# Patient Record
Sex: MALE | Race: BLACK OR AFRICAN AMERICAN | URBAN - METROPOLITAN AREA
[De-identification: names, ages, dates, MRNs, and addresses within clinical notes are randomized per-mention and may not be internally consistent; named-entity substitution may affect disease eponyms.]

---

## 2018-02-06 ENCOUNTER — OFFICE VISIT (OUTPATIENT)
Dept: ORTHOPEDIC SURGERY | Age: 63
End: 2018-02-06

## 2018-02-06 VITALS
HEART RATE: 56 BPM | RESPIRATION RATE: 18 BRPM | HEIGHT: 73 IN | SYSTOLIC BLOOD PRESSURE: 152 MMHG | DIASTOLIC BLOOD PRESSURE: 95 MMHG

## 2018-02-06 DIAGNOSIS — M48.062 SPINAL STENOSIS, LUMBAR REGION WITH NEUROGENIC CLAUDICATION: ICD-10-CM

## 2018-02-06 DIAGNOSIS — M51.36 DDD (DEGENERATIVE DISC DISEASE), LUMBAR: Primary | ICD-10-CM

## 2018-02-06 DIAGNOSIS — M54.16 LUMBAR NEURITIS: ICD-10-CM

## 2018-02-06 DIAGNOSIS — F41.8 TEST ANXIETY: Primary | ICD-10-CM

## 2018-02-06 RX ORDER — GABAPENTIN 300 MG/1
300 CAPSULE ORAL 3 TIMES DAILY
Qty: 90 CAP | Refills: 1 | Status: SHIPPED | OUTPATIENT
Start: 2018-02-06 | End: 2018-06-12 | Stop reason: SDUPTHER

## 2018-02-06 RX ORDER — AMLODIPINE BESYLATE 5 MG/1
TABLET ORAL
Refills: 0 | COMMUNITY
Start: 2018-02-02

## 2018-02-06 RX ORDER — CHLORTHALIDONE 25 MG/1
TABLET ORAL
Refills: 0 | COMMUNITY
Start: 2018-02-02

## 2018-02-06 RX ORDER — SOTALOL HYDROCHLORIDE 120 MG/1
TABLET ORAL
Refills: 0 | COMMUNITY
Start: 2018-01-19

## 2018-02-06 RX ORDER — TOPIRAMATE 50 MG/1
TABLET, FILM COATED ORAL
Refills: 0 | COMMUNITY
Start: 2018-01-25 | End: 2019-06-26

## 2018-02-06 RX ORDER — HYDROCODONE BITARTRATE AND ACETAMINOPHEN 5; 325 MG/1; MG/1
TABLET ORAL
Refills: 0 | COMMUNITY
Start: 2017-11-16 | End: 2021-06-16

## 2018-02-06 RX ORDER — DIAZEPAM 10 MG/1
TABLET ORAL
Qty: 1 TAB | Refills: 0 | OUTPATIENT
Start: 2018-02-06 | End: 2018-03-05 | Stop reason: ALTCHOICE

## 2018-02-06 RX ORDER — COLCHICINE 0.6 MG/1
TABLET, FILM COATED ORAL
Refills: 0 | COMMUNITY
Start: 2017-12-29

## 2018-02-06 RX ORDER — NALOXONE HYDROCHLORIDE 4 MG/.1ML
SPRAY NASAL
COMMUNITY
Start: 2018-02-01 | End: 2019-06-26

## 2018-02-06 NOTE — MR AVS SNAPSHOT
[facility] 
 
 
 [address] 200 [facility] 
[phone] Patient: Rowena Obrien Sr. MRN: [id number] Visit Information Date & Time Provider Department Dept. Phone Encounter #  
 2/6/2018 10:35 AM Nilsa Stroud MD [facility], [address] [facility] [place] [address] [phone] Follow-up Instructions Return for after block. Your Appointments 5/18/2018 10:00 AM  
Office Visit with Maureen Montoya MD  
Urology of [facility] [address] [facility]) Appt Note: 1 yr f/u with PSA  
 [address], [address] [address] [facility] [address] [facility] [address]  
  
   
 [address], [address] [facility] [address] Upcoming Health Maintenance Date Due Hepatitis C Screening 1955 Pneumococcal 19-64 Highest Risk (1 of 3 - PCV13) 11/11/1974 FOBT Q 1 YEAR AGE 50-75 11/11/2005 Influenza Age 5 to Adult 8/1/2017 DTaP/Tdap/Td series (2 - Td) 8/22/2021 Allergies as of 2/6/2018  Review Complete On: 2/6/2018 By: Nilsa Stroud MD  
  
 Severity Noted Reaction Type Reactions Topamax [Topiramate]  02/06/2018    Other (comments) Weakness and pain Current Immunizations  Never Reviewed Name Date Influenza Vaccine 1/25/2016 12:00 AM, 1/16/2015 12:00 AM  
 Influenza Vaccine PF 1/16/2015 12:00 AM  
 Tdap 8/22/2011 12:00 AM  
 Zoster Vaccine, Live 1/25/2016 12:00 AM  
  
 Not reviewed this visit You Were Diagnosed With   
  
 Codes Comments DDD (degenerative disc disease), lumbar    -  Primary ICD-10-CM: M51.36 
ICD-9-CM: 722.52 Lumbar neuritis     ICD-10-CM: M54.16 
ICD-9-CM: 724.4 Spinal stenosis, lumbar region with neurogenic claudication     ICD-10-CM: M48.062 
ICD-9-CM: 724.03 Vitals BP Pulse Resp Height(growth percentile) Smoking Status (!) 152/95 (!) 56 18 6' 1\" (1.854 m) Never Smoker Vitals History Preferred Pharmacy Pharmacy Name Phone [address], [address] Your Updated Medication List  
  
   
This list is accurate as of: 2/6/18 12:07 PM.  Always use your most recent med list.  
  
  
  
  
 allopurinol 100 mg tablet Commonly known as:  ZYLOPRIM  
100 mg. amLODIPine 5 mg tablet Commonly known as:  NORVASC  
take 1 tablet by mouth once daily  
  
 apixaban 5 mg tablet Commonly known as:  Edith Onaga Take 5 mg by mouth. atenolol 50 mg tablet Commonly known as:  TENORMIN Take 50 mg by mouth daily. avanafil 100 mg tablet Commonly known as:  BZFNFER Take 1 Tab by mouth as needed for Other. Take one tablet an hour prior to intercourse  
  
 chlorthalidone 25 mg tablet Commonly known as:  HYGROTEN  
take 1/2 tablet by mouth once daily COLCRYS 0.6 mg tablet Generic drug:  colchicine  
take 1 tablet by mouth once daily if needed repeat IF  2 HOURS LATER  
  
 furosemide 40 mg tablet Commonly known as:  LASIX Take  by mouth daily. gabapentin 300 mg capsule Commonly known as:  NEURONTIN Take 1 Cap by mouth three (3) times daily. HYDROcodone-acetaminophen 5-325 mg per tablet Commonly known as:  [address]  
take 1 tablet by mouth every 4 to 6 hours if needed for pain Lactobac comb 3-FOS-pantethine 300-250 million cell-mg Cap Take  by mouth.  
  
 naloxone 4 mg/actuation nasal spray Commonly known as:  NARCAN  
by Nasal route. predniSONE 10 mg tablet Commonly known as:  DELTASONE  
  
 sotalol 120 mg tablet Commonly known as:  BETAPACE  
take 1 tablet by mouth every 12 hours for HEART RATE CONTROL  
  
 tapentadol 150 mg Tb12 Take 150 mg by mouth. topiramate 50 mg tablet Commonly known as:  TOPAMAX  
take 1 tablet by mouth every 8 hours TYLENOL PO Take  by mouth. VACUUM ERECTION DEVICE SYSTEM  
by Does Not Apply route.   
  
 vardenafil 10 mg Tbdi  
 Commonly known as:  STAXYN  
10 mg by TransLINGual route daily as needed for 4 doses. VIAGRA PO Take  by mouth. ZOCOR 10 mg tablet Generic drug:  simvastatin Take  by mouth nightly. Prescriptions Sent to Pharmacy Refills  
 gabapentin (NEURONTIN) 300 mg capsule 1 Sig: Take 1 Cap by mouth three (3) times daily. Class: Normal  
 Pharmacy: [facility], [address] [facility] #: [phone] Route: Oral  
  
Follow-up Instructions Return for after block. Introducing [facility] & HEALTH SERVICES! [facility] introduces [email or web address] patient portal. Now you can access parts of your medical record, email your doctor's office, and request medication refills online. 1. In your internet browser, go to [email or web address] 2. Click on the First Time User? Click Here link in the Sign In box. You will see the New Member Sign Up page. 3. Enter your [email or web address] Access Code exactly as it appears below. You will not need to use this code after youve completed the sign-up process. If you do not sign up before the expiration date, you must request a new code. · [email or web address] Access Code: [email or web address] Expires: 5/6/2018  2:36 PM 
 
4. Enter the last four digits of your Social Security Number (xxxx) and Date of Birth (mm/dd/yyyy) as indicated and click Submit. You will be taken to the next sign-up page. 5. Create a Tokamak Solutionst ID. This will be your [email or web address] login ID and cannot be changed, so think of one that is secure and easy to remember. 6. Create a [email or web address] password. You can change your password at any time. 7. Enter your Password Reset Question and Answer. This can be used at a later time if you forget your password. 8. Enter your e-mail address. You will receive e-mail notification when new information is available in [address] 9. Click Sign Up. You can now view and download portions of your medical record. 10. Click the Download Summary menu link to download a portable copy of your medical information. If you have questions, please visit the Frequently Asked Questions section of the [email or web address] website. Remember, [email or web address] is NOT to be used for urgent needs. For medical emergencies, dial 911. Now available from your iPhone and Android! Please provide this summary of care documentation to your next provider. Your primary care clinician is listed as Alessandro Links. If you have any questions after today's visit, please call [phone].

## 2018-02-06 NOTE — PROGRESS NOTES
[facility]  [address], [address]  Phone: [phone]  Fax: [phone]        INITIAL CONSULTATION      HISTORY OF PRESENT ILLNESS:  Jerrell Madrigal Sr. is a 58 y.o. male whom is self-referred secondary to low back pain extending into hie RLE in an L5 distribution to the ankle x 2 weeks. He states RLE pain is most severe at this time. He rates pain 10/10. Denies injury/trauma. His pain is aggravated with any activity/position. Of note, per patient, he remotley saw Dr. Sarah Herrera, however he states his is not in pain management. At that time, Dr. Tony Lr performed a right knee injection. Patient's PMHx includes prostate cancer, and A-fib. He takes blood thinners since 11/2017. Patient was prescribed MDP by her PCP without relief. He reports he was also taken Topamax in the past without benefit, subsequenlty he discontinued the medication. Patient was rx Carlyle Bachelor but reports he never filled the prescription. Patient denies a h/o physical therapy/chiropractor. Pt denies h/o lumbar spinal surgery or blocks. Patient denies change in bowel or bladder habits. The patient is RHD. Lumbar spine MRI dated 12/26/17 reviewed. Per report, L3-L4 moderate/severe concentric stenosis. Super right paracentral inferiorly migrating dis extrusion whih more focally impinges traversing right-sided nerve roots. Moderate/severe right and moderate left-sided neural foraminal narrowing. Disc extrusion may contact the existing right l3 nerve root. L4-L moderate concentric stenosis and foraminal narrowing. Mild disc changes at l1-L2, L2-L3, and l5-S1. Loss of disc space height. Facet degenerative disease, moderate at L4-L5.  reviewed. There is no height or weight on file to calculate BMI.         Past Medical History:   Diagnosis Date    Arthropathy     ED (erectile dysfunction)     Essential hypertension     Heart murmur     Hypercholesteremia     Malignant neoplasm of prostate ([facility] [address] [id number] nikolas 3 + 3 adenoacarcinoma of the prostate s/p DVP 4/18/08    ALLAN (stress urinary incontinence), male     Ulcer ([facility] [address]           Past Surgical History:   Procedure Laterality Date    ENDOSCOPY, COLON, DIAGNOSTIC      HX COLECTOMY  4/2/15    HX HEART CATHETERIZATION      HX HERNIA REPAIR      32 years ago    HX MENISCECTOMY      HX ORCHIECTOMY      age 24    HX OTHER SURGICAL      angiogram    HX RADICAL PROSTATECTOMY  4/18/08    DVP, Dr[facility] Roverto Joseph, [facility]    HX UROLOGICAL  03/03/2008    prostate bx         Social History   Substance Use Topics    Smoking status: Never Smoker    Smokeless tobacco: Never Used    Alcohol use No     Work status: N/A. Marital status: The patient is . Current Outpatient Prescriptions   Medication Sig Dispense Refill    apixaban (ELIQUIS) 5 mg tablet Take 5 mg by mouth.  HYDROcodone-acetaminophen (NORCO) 5-325 mg per tablet take 1 tablet by mouth every 4 to 6 hours if needed for pain  0    sotalol (BETAPACE) 120 mg tablet take 1 tablet by mouth every 12 hours for HEART RATE CONTROL  0    Lactobac comb 3-FOS-pantethine 300-250 million cell-mg cap Take  by mouth.  gabapentin (NEURONTIN) 300 mg capsule Take 1 Cap by mouth three (3) times daily. 90 Cap 1    furosemide (LASIX) 40 mg tablet Take  by mouth daily.  ACETAMINOPHEN (TYLENOL PO) Take  by mouth.  simvastatin (ZOCOR) 10 mg tablet Take  by mouth nightly.  amLODIPine (NORVASC) 5 mg tablet take 1 tablet by mouth once daily  0    chlorthalidone (HYGROTEN) 25 mg tablet take 1/2 tablet by mouth once daily  0    COLCRYS 0.6 mg tablet take 1 tablet by mouth once daily if needed repeat IF  2 HOURS LATER  0    topiramate (TOPAMAX) 50 mg tablet take 1 tablet by mouth every 8 hours  0    naloxone (NARCAN) 4 mg/actuation nasal spray by Nasal route.  tapentadol 150 mg Tb12 Take 150 mg by mouth.       allopurinol (ZYLOPRIM) 100 mg tablet 100 mg.  0    predniSONE (DELTASONE) 10 mg tablet   0    avanafil (STENDRA) 100 mg tablet Take 1 Tab by mouth as needed for Other. Take one tablet an hour prior to intercourse 6 Tab 11    vardenafil (STAXYN) 10 mg TbDL 10 mg by TransLINGual route daily as needed for 4 doses. 10 Tab 5    SILDENAFIL CITRATE (VIAGRA PO) Take  by mouth.  atenolol (TENORMIN) 50 mg tablet Take 50 mg by mouth daily.  VACUUM ERECTION DEVICE SYSTEM by Does Not Apply route. Allergies   Allergen Reactions    Topamax [Topiramate] Other (comments)     Weakness and pain            Family History   Problem Relation Age of Onset    Cancer Mother          REVIEW OF SYSTEMS  Constitutional symptoms: Negative  Eyes: Negative  Ears, Nose, Throat, and Mouth: Negative  Cardiovascular: Negative  Respiratory: Negative  Genitourinary: Negative  Integumentary (Skin and/or breast): Negative  Musculoskeletal: Positive for low back pain, RLE  Extremities: Negative for edema. Endocrine/Rheumatologic: Negative  Hematologic/Lymphatic: Negative  Allergic/Immunologic: Negative  Psychiatric: Negative     Examined in a wheelchair. PHYSICAL EXAMINATION    Visit Vitals    BP (!) 152/95  Comment: pt asymptomatic    Pulse (!) 56    Resp 18    Ht 6' 1\" (1.854 m)       CONSTITUTIONAL: NAD, A&O x 3  HEART: Regular rate and rhythm. Murmur noted. ABDOMEN: Positive bowel sounds, soft, nontender, and nondistended  LUNGS: Clear to auscultation bilaterally. SKIN: No rashes noted. RANGE OF MOTION: The patient has full passive range of motion in all four extremities. SENSATION: Decreased sensation to light touch of the RLE circumferentially  . MOTOR:   Straight Leg Raise: Negative, bilateral  Sanchez: Positive, right  Deep tendon reflexes are 1+ at the brachioradialis, 1+ biceps, and 0 at the triceps of the RUE  Deep tendon reflexes are 0 at the brachioradialis, biceps, and 1+ triceps of the LUE  Deep tendon reflexes are o at the knees and ankles bilaterally.  1+ at the left and at the        Shoulder AB/Flex Elbow Flex Wrist Ext Elbow Ext Wrist Flex Hand Intrin Tone   Right +4/5 +4/5 +4/5 +4/5 +4/5 +4/5 +4/5   Left +4/5 +4/5 +4/5 +4/5 +4/5 +4/5 +4/5              Hip Flex Knee Ext Knee Flex Ankle DF GTE Ankle PF Tone   Right +4/5 +4/5 +4/5 +4/5 +4/5 +4/5 +4/5   Left +4/5 +4/5 +4/5 +4/5 +4/5 +4/5 +4/5         ASSESSMENT   Diagnoses and all orders for this visit:    1. DDD (degenerative disc disease), lumbar    2. Lumbar neuritis    3. Spinal stenosis, lumbar region with neurogenic claudication    Other orders  -     gabapentin (NEURONTIN) 300 mg capsule; Take 1 Cap by mouth three (3) times daily.  -     SCHEDULE SURGERY           IMPRESSIONS/RECOMMENDATIONS:  Patient presents with low back pain extending into his RLE to the ankle x 2 weeks. He states his pain is most severe at the right lower extremity. I discussed multiple treatment options. Patient would like to process with block. I will order a right-sided L4/5 SNRB. In the interim, I will try him on Neurontin 300 mg TID. The risks, benefits, and potential side effects of this medication were discussed. Patient understands and wishes to proceed. Patient advised to call the office if intolerant to new medication. I will see the patient back after block. Written by Ravinder Prince, as dictated by Gabby Berg MD  I examined the patient, reviewed and agree with the note.

## 2018-02-09 ENCOUNTER — DOCUMENTATION ONLY (OUTPATIENT)
Dept: ORTHOPEDIC SURGERY | Age: 63
End: 2018-02-09

## 2018-02-16 ENCOUNTER — TELEPHONE (OUTPATIENT)
Dept: ORTHOPEDIC SURGERY | Age: 63
End: 2018-02-16

## 2018-02-16 NOTE — TELEPHONE ENCOUNTER
Patient has started the gabapentin but is c/o thigh cramping and sore to the touch. He has called again and asked that someone please call him.   Thank you

## 2018-02-16 NOTE — TELEPHONE ENCOUNTER
Called patient back and inquired about his symptoms. Patient states it started last night and woke him up out of his sleep. Patient states he has a throbbing sensation in his leg and as soon as he touches his leg it hurts. Patient states he read something in the pamphlet about the muscles. Patient states it is worse at night and he got a little relief today but it starts back up. I informed patient the provider has left for the day and I will call them in regards to this message. Patient verbalized understanding. I called NP Brandy Kanner and informed her of the above and she stated to have the patient to take 2 Gabapentin at night for a few days and if no relief go up to taking two pils in the am a nd       Called and infromed patient per the provider of the above message and patient verbalized understanding.

## 2018-02-16 NOTE — TELEPHONE ENCOUNTER
Patient called me just now and stated that he is having some  Rt thigh pain and that it is sore to the touch Please call pt .  Patient had a spinal injection this week 2/13

## 2018-02-16 NOTE — TELEPHONE ENCOUNTER
The block may take a few days to become effective. Per the note, he was to start Gabapentin. Please see if he has started this medication. If he has, this should start helping, but may also take some time.

## 2018-03-05 ENCOUNTER — OFFICE VISIT (OUTPATIENT)
Dept: ORTHOPEDIC SURGERY | Age: 63
End: 2018-03-05

## 2018-03-05 VITALS
WEIGHT: 286 LBS | HEIGHT: 73 IN | BODY MASS INDEX: 37.91 KG/M2 | DIASTOLIC BLOOD PRESSURE: 87 MMHG | SYSTOLIC BLOOD PRESSURE: 138 MMHG | RESPIRATION RATE: 16 BRPM | HEART RATE: 66 BPM

## 2018-03-05 DIAGNOSIS — M51.36 DDD (DEGENERATIVE DISC DISEASE), LUMBAR: Primary | ICD-10-CM

## 2018-03-05 DIAGNOSIS — M54.16 LUMBAR RADICULOPATHY: ICD-10-CM

## 2018-03-05 DIAGNOSIS — M51.26 HNP (HERNIATED NUCLEUS PULPOSUS), LUMBAR: ICD-10-CM

## 2018-03-05 PROBLEM — M48.062 SPINAL STENOSIS, LUMBAR REGION WITH NEUROGENIC CLAUDICATION: Status: ACTIVE | Noted: 2018-03-05

## 2018-03-05 RX ORDER — POTASSIUM CHLORIDE 20 MEQ/1
TABLET, EXTENDED RELEASE ORAL
Refills: 0 | COMMUNITY
Start: 2018-03-02

## 2018-03-05 RX ORDER — GABAPENTIN 600 MG/1
600 TABLET ORAL 3 TIMES DAILY
Qty: 90 TAB | Refills: 1 | Status: SHIPPED | OUTPATIENT
Start: 2018-03-05 | End: 2018-03-09 | Stop reason: ALTCHOICE

## 2018-03-05 NOTE — MR AVS SNAPSHOT
[address] [facility] 
 
 
 [address] 226 [facility] 
[phone] Patient: Aliya Nina Sr. MRN: [id number] Visit Information Date & Time Provider Department Dept. Phone Encounter #  
 3/5/2018  1:05 PM Aubrey Aguilar MD [facility] [place] [phone] Follow-up Instructions Return in about 4 weeks (around 4/2/2018). Your Appointments 5/18/2018 10:00 AM  
Office Visit with Peg Murguia MD  
[facility] of [facility]) Appt Note: 1 yr f/u with PSA  
 [address], [address] [facility] [address]  
  
   
 [address], [address] [place] [address] Upcoming Health Maintenance Date Due Hepatitis C Screening 1955 Pneumococcal 19-64 Highest Risk (1 of 3 - PCV13) 11/11/1974 FOBT Q 1 YEAR AGE 50-75 11/11/2005 DTaP/Tdap/Td series (2 - Td) 8/22/2021 Allergies as of 3/5/2018  Review Complete On: 3/5/2018 By: Aubrey Aguilar MD  
  
 Severity Noted Reaction Type Reactions Topamax [Topiramate]  02/06/2018    Other (comments) Weakness and pain Current Immunizations  Never Reviewed Name Date Influenza Vaccine 1/25/2016 12:00 AM, 1/16/2015 12:00 AM  
 Influenza Vaccine PF 1/16/2015 12:00 AM  
 Tdap 8/22/2011 12:00 AM  
 Zoster Vaccine, Live 1/25/2016 12:00 AM  
  
 Not reviewed this visit You Were Diagnosed With   
  
 Codes Comments DDD (degenerative disc disease), lumbar    -  Primary ICD-10-CM: M51.36 
ICD-9-CM: 722.52 Lumbar radiculopathy     ICD-10-CM: M54.16 
ICD-9-CM: 724.4 HNP (herniated nucleus pulposus), lumbar     ICD-10-CM: M51.26 
ICD-9-CM: 722.10 Vitals BP Pulse Resp Height(growth percentile) Weight(growth percentile) BMI  
 138/87 66 16 6' 1\" (1.854 m) 286 lb (129.7 kg) 37.73 kg/m2 Smoking Status Never Smoker Vitals History BMI and BSA Data Body Mass Index Body Surface Area  
 37.73 kg/m 2 2.58 m 2 Preferred Pharmacy Pharmacy Name Phone [address], [address] Your Updated Medication List  
  
   
This list is accurate as of 3/5/18  1:52 PM.  Always use your most recent med list.  
  
  
  
  
 allopurinol 100 mg tablet Commonly known as:  ZYLOPRIM  
100 mg. amLODIPine 5 mg tablet Commonly known as:  NORVASC  
take 1 tablet by mouth once daily  
  
 apixaban 5 mg tablet Commonly known as:  Jose G Becket Take 5 mg by mouth. atenolol 50 mg tablet Commonly known as:  TENORMIN Take 50 mg by mouth daily. avanafil 100 mg tablet Commonly known as:  SFUVFYD Take 1 Tab by mouth as needed for Other. Take one tablet an hour prior to intercourse  
  
 chlorthalidone 25 mg tablet Commonly known as:  HYGROTEN  
take 1/2 tablet by mouth once daily COLCRYS 0.6 mg tablet Generic drug:  colchicine  
take 1 tablet by mouth once daily if needed repeat IF  2 HOURS LATER  
  
 furosemide 40 mg tablet Commonly known as:  LASIX Take  by mouth daily. * gabapentin 300 mg capsule Commonly known as:  NEURONTIN Take 1 Cap by mouth three (3) times daily. * gabapentin 600 mg tablet Commonly known as:  NEURONTIN Take 1 Tab by mouth three (3) times daily. HYDROcodone-acetaminophen 5-325 mg per tablet Commonly known as:  Trina Records  
take 1 tablet by mouth every 4 to 6 hours if needed for pain Lactobac comb 3-FOS-pantethine 300-250 million cell-mg Cap Take  by mouth.  
  
 naloxone 4 mg/actuation nasal spray Commonly known as:  NARCAN  
by Nasal route. potassium chloride 20 mEq tablet Commonly known as:  K-DUR, KLOR-CON  
TAKE 1 TABLET TWICE A DAY FOR THREE DAYS ONLY ,THEN DECREASE TO 1 TABLET DAILY THEREAFTER  
  
 predniSONE 10 mg tablet Commonly known as:  DELTASONE  
  
 sotalol 120 mg tablet Commonly known as:  BETAPACE  
take 1 tablet by mouth every 12 hours for HEART RATE CONTROL  
  
 tapentadol 150 mg Tb12 Take 150 mg by mouth. topiramate 50 mg tablet Commonly known as:  TOPAMAX  
take 1 tablet by mouth every 8 hours TYLENOL PO Take  by mouth. VACUUM ERECTION DEVICE SYSTEM  
by Does Not Apply route. vardenafil 10 mg Tbdi  
Commonly known as:  STAXYN  
10 mg by TransLINGual route daily as needed for 4 doses. VIAGRA PO Take  by mouth. ZOCOR 10 mg tablet Generic drug:  simvastatin Take  by mouth nightly. * Notice: This list has 2 medication(s) that are the same as other medications prescribed for you. Read the directions carefully, and ask your doctor or other care provider to review them with you. Prescriptions Sent to Pharmacy Refills  
 gabapentin (NEURONTIN) 600 mg tablet 1 Sig: Take 1 Tab by mouth three (3) times daily. Class: Normal  
 Pharmacy: [place] [id number], [address] [place] [address] #: [phone] Route: Oral  
  
We Performed the Following REFERRAL TO PHYSICAL THERAPY [ASL40 Custom] Comments:  
 DX:LBP to RLE Eval and Treat Gayla Love 2-3 visits/ 2-3 weeks Follow-up Instructions Return in about 4 weeks (around 4/2/2018). Referral Information Referral ID Referred By Referred To  
  
 [id number] RALPH MAYO III Not Available Visits Status Start Date End Date 1 New Request 3/5/18 3/5/19 If your referral has a status of pending review or denied, additional information will be sent to support the outcome of this decision. Introducing [facility] & HEALTH SERVICES! [facility] introduces Caral[employer] patient portal. Now you can access parts of your medical record, email your doctor's office, and request medication refills online.    
 
1. In your internet browser, go to [email or web address] 2. Click on the First Time User? Click Here link in the Sign In box. You will see the New Member Sign Up page. 3. Enter your Schmoozer Access Code exactly as it appears below. You will not need to use this code after youve completed the sign-up process. If you do not sign up before the expiration date, you must request a new code. · Schmoozer Access Code: [email or web address] Expires: 5/6/2018  2:36 PM 
 
4. Enter the last four digits of your Social Security Number (xxxx) and Date of Birth (mm/dd/yyyy) as indicated and click Submit. You will be taken to the next sign-up page. 5. Create a Smart Platet ID. This will be your Schmoozer login ID and cannot be changed, so think of one that is secure and easy to remember. 6. Create a Schmoozer password. You can change your password at any time. 7. Enter your Password Reset Question and Answer. This can be used at a later time if you forget your password. 8. Enter your e-mail address. You will receive e-mail notification when new information is available in [address] 9. Click Sign Up. You can now view and download portions of your medical record. 10. Click the Download Summary menu link to download a portable copy of your medical information. If you have questions, please visit the Frequently Asked Questions section of the Schmoozer website. Remember, Schmoozer is NOT to be used for urgent needs. For medical emergencies, dial 911. Now available from your iPhone and Android! Please provide this summary of care documentation to your next provider. Your primary care clinician is listed as Maude Colón. If you have any questions after today's visit, please call [phone].

## 2018-03-05 NOTE — PROGRESS NOTES
[facility]  [address], [place]   Phone: [phone]  Fax: [phone]        PROGRESS NOTE      HISTORY OF PRESENT ILLNESS:  The patient is a 58 y.o. male and was seen today for follow up of low back pain extending into his RLE in an L5 distribution to the ankle x 5 weeks. He states RLE pain is most severe at this time. Denies injury/trauma. His pain is aggravated with any activity/position. Of note, per patient, he remotely saw Dr. Silvia Trevino, however he states his is not in pain management. At that time, Dr. Silvia Trevino performed a right knee injection. Patient's PMHx includes prostate cancer (no chemotherapy), and A-fib. He takes blood thinners since 11/2017. Patient was prescribed MDP by her PCP without relief. He reports he was also taken Topamax in the past without benefit, subsequently he discontinued the medication. Patient was rx Etha Potter but reports he never filled the prescription. Patient denies a h/o physical therapy/chiropractor. Pt denies h/o lumbar spinal surgery or blocks. Patient denies change in bowel or bladder habits. The patient is RHD. Lumbar spine MRI dated 12/26/17 reviewed. Per report, L3-L4 moderate/severe concentric stenosis. Super right paracentral inferiorly migrating disc extrusion whih more focally impinges traversing right-sided nerve roots. Moderate/severe right and moderate left-sided neural foraminal narrowing. Disc extrusion may contact the existing right l3 nerve root. L4-L moderate concentric stenosis and foraminal narrowing. Mild disc changes at l1-L2, L2-L3, and l5-S1. Loss of disc space height. Facet degenerative disease, moderate at L4-L5. At his last clinic appointment, patient presented with low back pain extending into his RLE to the ankle x 2 weeks. He stated his pain is most severe at the right lower extremity. I discussed multiple treatment options. Patient wantedto process with block.  I ordered a right-sided L4/5 SNRB. In the interim, I tried him on Neurontin 300 mg TID. I will see the patient back after block. The patient returns today with pain location and distribution remain unchanged. He rates pain 2/10, a decrease since his last visit (10/10). Pt underwent right-sided L4/5 SNRB on 2/13/18 with improvement in symptoms. Pt tolerates Neurontin 300 mg TID well with some benefit.  reviewed. Body mass index is 37.73 kg/(m^2). Past Medical History:   Diagnosis Date    Arthropathy     ED (erectile dysfunction)     Essential hypertension     Heart murmur     Hypercholesteremia     Malignant neoplasm of prostate (HCC)     [id number] nikolas 3 + 3 adenoacarcinoma of the prostate s/p DVP 4/18/08    ALLAN (stress urinary incontinence), male     Ulcer ([facility] [address]         Social History     Social History    Marital status:      Spouse name: N/A    Number of children: N/A    Years of education: N/A     Occupational History    Not on file. Social History Main Topics    Smoking status: Never Smoker    Smokeless tobacco: Never Used    Alcohol use No    Drug use: No    Sexual activity: Yes     Partners: Female     Other Topics Concern    Not on file     Social History Narrative       Current Outpatient Prescriptions   Medication Sig Dispense Refill    potassium chloride (K-DUR, KLOR-CON) 20 mEq tablet TAKE 1 TABLET TWICE A DAY FOR THREE DAYS ONLY ,THEN DECREASE TO 1 TABLET DAILY THEREAFTER  0    gabapentin (NEURONTIN) 600 mg tablet Take 1 Tab by mouth three (3) times daily. 90 Tab 1    apixaban (ELIQUIS) 5 mg tablet Take 5 mg by mouth.       amLODIPine (NORVASC) 5 mg tablet take 1 tablet by mouth once daily  0    chlorthalidone (HYGROTEN) 25 mg tablet take 1/2 tablet by mouth once daily  0    COLCRYS 0.6 mg tablet take 1 tablet by mouth once daily if needed repeat IF  2 HOURS LATER  0    HYDROcodone-acetaminophen (NORCO) 5-325 mg per tablet take 1 tablet by mouth every 4 to 6 hours if needed for pain  0    sotalol (BETAPACE) 120 mg tablet take 1 tablet by mouth every 12 hours for HEART RATE CONTROL  0    gabapentin (NEURONTIN) 300 mg capsule Take 1 Cap by mouth three (3) times daily. 90 Cap 1    SILDENAFIL CITRATE (VIAGRA PO) Take  by mouth.  ACETAMINOPHEN (TYLENOL PO) Take  by mouth.  simvastatin (ZOCOR) 10 mg tablet Take  by mouth nightly.  topiramate (TOPAMAX) 50 mg tablet take 1 tablet by mouth every 8 hours  0    Lactobac comb 3-FOS-pantethine 300-250 million cell-mg cap Take  by mouth.  naloxone (NARCAN) 4 mg/actuation nasal spray by Nasal route.  tapentadol 150 mg Tb12 Take 150 mg by mouth.  furosemide (LASIX) 40 mg tablet Take  by mouth daily.  allopurinol (ZYLOPRIM) 100 mg tablet 100 mg.  0    predniSONE (DELTASONE) 10 mg tablet   0    avanafil (STENDRA) 100 mg tablet Take 1 Tab by mouth as needed for Other. Take one tablet an hour prior to intercourse (Patient not taking: Reported on 3/5/2018) 6 Tab 11    vardenafil (STAXYN) 10 mg TbDL 10 mg by TransLINGual route daily as needed for 4 doses. (Patient not taking: Reported on 3/5/2018) 10 Tab 5    atenolol (TENORMIN) 50 mg tablet Take 50 mg by mouth daily.  VACUUM ERECTION DEVICE SYSTEM by Does Not Apply route. Allergies   Allergen Reactions    Topamax [Topiramate] Other (comments)     Weakness and pain        Pt ambulated with a quad cane. PHYSICAL EXAMINATION    Visit Vitals    /87    Pulse 66    Resp 16    Ht 6' 1\" (1.854 m)    Wt 286 lb (129.7 kg)    BMI 37.73 kg/m2       CONSTITUTIONAL: NAD, A&O x 3  SENSATION: Intact to light touch throughout  RANGE OF MOTION: The patient has full passive range of motion in all four extremities.   MOTOR:  Straight Leg Raise: Negative, bilateral               Hip Flex Knee Ext Knee Flex Ankle DF GTE Ankle PF Tone   Right +4/5 +4/5 +4/5 +4/5 +4/5 +4/5 +4/5   Left +4/5 +4/5 +4/5 +4/5 +4/5 +4/5 +4/5       ASSESSMENT Diagnoses and all orders for this visit:    1. DDD (degenerative disc disease), lumbar  -     REFERRAL TO PHYSICAL THERAPY    2. Lumbar radiculopathy  -     REFERRAL TO PHYSICAL THERAPY    3. HNP (herniated nucleus pulposus), lumbar  -     REFERRAL TO PHYSICAL THERAPY    Other orders  -     gabapentin (NEURONTIN) 600 mg tablet; Take 1 Tab by mouth three (3) times daily. IMPRESSION AND PLAN:  The patient is s/p right-sided L4/5 SNRB noting an improvement in symptoms. Patient is neurologically intact. I will increase his Neurontin to 600 mg TID. Patient advised to call the office if intolerant to higher dose. I will refer him to physical therapy with an emphasis on HEP. I will see the patient back in 1 month's time or earlier if needed. Written by Aye Cain, as dictated by Shahzad Parkinson MD  I examined the patient, reviewed and agree with the note.

## 2018-03-09 ENCOUNTER — TELEPHONE (OUTPATIENT)
Dept: ORTHOPEDIC SURGERY | Age: 63
End: 2018-03-09

## 2018-03-09 RX ORDER — GABAPENTIN 300 MG/1
300 CAPSULE ORAL 3 TIMES DAILY
Qty: 20 CAP | Refills: 0 | Status: SHIPPED | OUTPATIENT
Start: 2018-03-09 | End: 2018-04-02 | Stop reason: SDUPTHER

## 2018-03-09 NOTE — TELEPHONE ENCOUNTER
Call pt back and see what kind of heart problems? Looks like he was tolerating the Gabapentin 300mg TID dose.

## 2018-03-09 NOTE — TELEPHONE ENCOUNTER
PATIENT CALLED FOR DR. MAYO. PATIENT SAID THAT THE GABAPENTIN 600 MG IS GIVING HIS HEART PROBLEMS. PATIENT SAID THAT DR. MAYO TOLD HIM IF IT CAUSED HIM ANY PROBLEMS TO CALL HIM. [facility] TEL. [phone]    PATIENT WOULD LIKE A CALL BACK AT [facility]. [phone] OR # [phone].

## 2018-03-09 NOTE — TELEPHONE ENCOUNTER
He should get his palpitations checked out sooner rather than later with his PCP or cardiologist. If he can't then he should go to urgent care or ER. Since Gabapentin isn't something he should suddenly stop, I have approved a small amt for him to taper off of it.  If he thinks this is causing palpitations he should completely taper off of it and then stop it

## 2018-03-09 NOTE — TELEPHONE ENCOUNTER
Patient states that he is having palpitations with the 600mg. He states that he has a history of AFib. He would like to stay on Neurontin 300mg if that is ok. I have pended another rx for Neurontin 300mg TID #90 RF1 if needed.

## 2018-03-12 NOTE — TELEPHONE ENCOUNTER
Patient is aware. He states that he has an appointment with his Cardiologist on Thursday and they are aware of what is going on. I have given him his instructions on how to come off of the Neurontin and he read them back to me.

## 2018-04-02 ENCOUNTER — OFFICE VISIT (OUTPATIENT)
Dept: ORTHOPEDIC SURGERY | Age: 63
End: 2018-04-02

## 2018-04-02 VITALS
HEIGHT: 73 IN | SYSTOLIC BLOOD PRESSURE: 119 MMHG | HEART RATE: 65 BPM | OXYGEN SATURATION: 95 % | DIASTOLIC BLOOD PRESSURE: 83 MMHG | BODY MASS INDEX: 37.64 KG/M2 | WEIGHT: 284 LBS

## 2018-04-02 DIAGNOSIS — M54.16 LUMBAR RADICULOPATHY: ICD-10-CM

## 2018-04-02 DIAGNOSIS — M51.26 HNP (HERNIATED NUCLEUS PULPOSUS), LUMBAR: ICD-10-CM

## 2018-04-02 DIAGNOSIS — M51.36 DDD (DEGENERATIVE DISC DISEASE), LUMBAR: ICD-10-CM

## 2018-04-02 DIAGNOSIS — M48.062 SPINAL STENOSIS, LUMBAR REGION WITH NEUROGENIC CLAUDICATION: Primary | ICD-10-CM

## 2018-04-02 NOTE — PROGRESS NOTES
[facility]  [address]  [address], [address] [place]   Phone: [phone]  Fax: [phone]        PROGRESS NOTE      HISTORY OF PRESENT ILLNESS:  The patient is a 58 y.o. male and was seen today for follow up of low back pain extending into his RLE in an L5 distribution to the ankle since late-January 2018. He states RLE pain is most severe at this time. Denies injury/trauma. His pain is aggravated with any activity/position. Per patient, he remotely saw Dr. Brian Rios but was not in pain management. At that time, Dr. Brian Rios performed a right knee injection. Pt underwent right-sided L4/5 SNRB on 2/13/18 with improvement in symptoms. Pt was prescribed MDP by her PCP without relief. He reports he was also taking Topamax in the past without benefit, subsequently he discontinued the medication. Pt was prescribed Norco but reports he never filled the prescription. PMHx includes prostate cancer (no chemotherapy), and A-fib (Eliquis). Pt denies h/o lumbar spinal surgery. Pt denies change in bowel or bladder habits. The patient is RHD. Lumbar spine MRI dated 12/26/17 reviewed. Per report, L3-L4 moderate/severe concentric stenosis. Super right paracentral inferiorly migrating disc extrusion which more focally impinges traversing right-sided nerve roots. Moderate/severe right and moderate left-sided neural foraminal narrowing. Disc extrusion may contact the existing right l3 nerve root. L4-L moderate concentric stenosis and foraminal narrowing. Mild disc changes at l1-L2, L2-L3, and l5-S1. Loss of disc space height. Facet degenerative disease, moderate at L4-L5. At his last clinic appointment, the patient was s/p right-sided L4/5 SNRB noting an improvement in symptoms. Patient was neurologically intact. I increased his Neurontin to 600 mg TID. I referred him to physical therapy with an emphasis on HEP. The patient returns today with pain location and distribution remain unchanged.  He rates pain 1/10, an improvement since his last visit (2/10). Therapy notes reviewed. Pt continues to be enrolled in physical therapy. Pt called our office stating the increased dose of Neurontin 600 mg TID was causing cardiac issues. Per patient, his cardiologist believed his cardiac issues were related to his A-fib and discussed an ablation. Pt is currently taking Neurontin 300 mg TID.  reviewed. Body mass index is 37.47 kg/(m^2). Past Medical History:   Diagnosis Date    Arthropathy     ED (erectile dysfunction)     Essential hypertension     Heart murmur     Hypercholesteremia     Malignant neoplasm of prostate (HCC)     [id number] nikolas 3 + 3 adenoacarcinoma of the prostate s/p DVP 4/18/08    ALLAN (stress urinary incontinence), male     Ulcer         Social History     Social History    Marital status:      Spouse name: N/A    Number of children: N/A    Years of education: N/A     Occupational History    Not on file. Social History Main Topics    Smoking status: Never Smoker    Smokeless tobacco: Never Used    Alcohol use No    Drug use: No    Sexual activity: Yes     Partners: Female     Other Topics Concern    Not on file     Social History Narrative       Current Outpatient Prescriptions   Medication Sig Dispense Refill    potassium chloride (K-DUR, KLOR-CON) 20 mEq tablet TAKE 1 TABLET TWICE A DAY FOR THREE DAYS ONLY ,THEN DECREASE TO 1 TABLET DAILY THEREAFTER  0    apixaban (ELIQUIS) 5 mg tablet Take 5 mg by mouth.  amLODIPine (NORVASC) 5 mg tablet take 1 tablet by mouth once daily  0    chlorthalidone (HYGROTEN) 25 mg tablet take 1/2 tablet by mouth once daily  0    COLCRYS 0.6 mg tablet take 1 tablet by mouth once daily if needed repeat IF  2 HOURS LATER  0    sotalol (BETAPACE) 120 mg tablet take 1 tablet by mouth every 12 hours for HEART RATE CONTROL  0    Lactobac comb 3-FOS-pantethine 300-250 million cell-mg cap Take  by mouth.       gabapentin (NEURONTIN) 300 mg capsule Take 1 Cap by mouth three (3) times daily. 90 Cap 1    furosemide (LASIX) 40 mg tablet Take  by mouth daily.  allopurinol (ZYLOPRIM) 100 mg tablet 100 mg.  0    predniSONE (DELTASONE) 10 mg tablet   0    ACETAMINOPHEN (TYLENOL PO) Take  by mouth.  simvastatin (ZOCOR) 10 mg tablet Take  by mouth nightly.  VACUUM ERECTION DEVICE SYSTEM by Does Not Apply route.  HYDROcodone-acetaminophen (NORCO) 5-325 mg per tablet take 1 tablet by mouth every 4 to 6 hours if needed for pain  0    topiramate (TOPAMAX) 50 mg tablet take 1 tablet by mouth every 8 hours  0    naloxone (NARCAN) 4 mg/actuation nasal spray by Nasal route.  tapentadol 150 mg Tb12 Take 150 mg by mouth.  avanafil (STENDRA) 100 mg tablet Take 1 Tab by mouth as needed for Other. Take one tablet an hour prior to intercourse (Patient not taking: Reported on 3/5/2018) 6 Tab 11    vardenafil (STAXYN) 10 mg TbDL 10 mg by TransLINGual route daily as needed for 4 doses. (Patient not taking: Reported on 3/5/2018) 10 Tab 5    SILDENAFIL CITRATE (VIAGRA PO) Take  by mouth.  atenolol (TENORMIN) 50 mg tablet Take 50 mg by mouth daily. Allergies   Allergen Reactions    Topamax [Topiramate] Other (comments)     Weakness and pain          PHYSICAL EXAMINATION    Visit Vitals    /83 (BP 1 Location: Left arm, BP Patient Position: Sitting)    Pulse 65    Ht 6' 1\" (1.854 m)    Wt 284 lb (128.8 kg)    SpO2 95%    BMI 37.47 kg/m2       CONSTITUTIONAL: NAD, A&O x 3  SENSATION: Intact to light touch throughout  RANGE OF MOTION: The patient has full passive range of motion in all four extremities. MOTOR:  Straight Leg Raise: Negative, bilateral               Hip Flex Knee Ext Knee Flex Ankle DF GTE Ankle PF Tone   Right +4/5 +4/5 +4/5 +4/5 +4/5 +4/5 +4/5   Left +4/5 +4/5 +4/5 +4/5 +4/5 +4/5 +4/5       ASSESSMENT   Diagnoses and all orders for this visit:    1.  Spinal stenosis, lumbar region with neurogenic claudication    2. HNP (herniated nucleus pulposus), lumbar    3. Lumbar radiculopathy    4. DDD (degenerative disc disease), lumbar          IMPRESSION AND PLAN:  Patient should complete physical therapy as prescribed and continue with his HEP daily. He is doing well on Neurontin 300 mg TID. Patient does not need refills. I will see the patient back in 6 week's time or earlier if needed. Written by Marily Collins, as dictated by Lynette Cox MD  I examined the patient, reviewed and agree with the note.

## 2018-04-02 NOTE — MR AVS SNAPSHOT
[address] [facility] 
 
 
 [address] [facility] 
[phone] Patient: Avery Miguel Sr. MRN: [id number] Visit Information Date & Time Provider Department Dept. Phone Encounter #  
 4/2/2018 11:20 AM João Aggarwal MD [facility] [place] [id number] Follow-up Instructions Return in about 6 weeks (around 5/14/2018). Your Appointments 5/14/2018  2:00 PM  
Office Visit with Judy Paez MD  
[facility]) Appt Note: 1 yr f/u with PSA; 3/9/18 LM to resched. appt due to [facility] being in surgery -[facility]  
 [address], [place] [address] [facility] [address]  
  
   
 [address] [facility], [address] [facility] [address] Upcoming Health Maintenance Date Due Hepatitis C Screening 1955 Pneumococcal 19-64 Highest Risk (1 of 3 - PCV13) 11/11/1974 FOBT Q 1 YEAR AGE 50-75 11/11/2005 DTaP/Tdap/Td series (2 - Td) 8/22/2021 Allergies as of 4/2/2018  Review Complete On: 4/2/2018 By: João Aggarwal MD  
  
 Severity Noted Reaction Type Reactions Topamax [Topiramate]  02/06/2018    Other (comments) Weakness and pain Current Immunizations  Never Reviewed Name Date Influenza Vaccine 1/25/2016 12:00 AM, 1/16/2015 12:00 AM  
 Influenza Vaccine PF 1/16/2015 12:00 AM  
 Tdap 8/22/2011 12:00 AM  
 Zoster Vaccine, Live 1/25/2016 12:00 AM  
  
 Not reviewed this visit You Were Diagnosed With   
  
 Codes Comments Spinal stenosis, lumbar region with neurogenic claudication    -  Primary ICD-10-CM: M48.062 
ICD-9-CM: 724.03   
 HNP (herniated nucleus pulposus), lumbar     ICD-10-CM: M51.26 
ICD-9-CM: 722.10 Lumbar radiculopathy     ICD-10-CM: M54.16 
ICD-9-CM: 724.4 DDD (degenerative disc disease), lumbar     ICD-10-CM: M51.36 
ICD-9-CM: 722.52 Vitals BP Pulse Height(growth percentile) Weight(growth percentile) SpO2 BMI  
 119/83 (BP 1 Location: Left arm, BP Patient Position: Sitting) 65 6' 1\" (1.854 m) 284 lb (128.8 kg) 95% 37.47 kg/m2 Smoking Status Never Smoker BMI and BSA Data Body Mass Index Body Surface Area  
 37.47 kg/m 2 2.58 m 2 Preferred Pharmacy Pharmacy Name Phone [address], [address] Your Updated Medication List  
  
   
This list is accurate as of 4/2/18 11:52 AM.  Always use your most recent med list.  
  
  
  
  
 allopurinol 100 mg tablet Commonly known as:  ZYLOPRIM  
100 mg. amLODIPine 5 mg tablet Commonly known as:  NORVASC  
take 1 tablet by mouth once daily  
  
 apixaban 5 mg tablet Commonly known as:  Destiny Pillow Take 5 mg by mouth. atenolol 50 mg tablet Commonly known as:  TENORMIN Take 50 mg by mouth daily. avanafil 100 mg tablet Commonly known as:  CUSSOET Take 1 Tab by mouth as needed for Other. Take one tablet an hour prior to intercourse  
  
 chlorthalidone 25 mg tablet Commonly known as:  HYGROTEN  
take 1/2 tablet by mouth once daily COLCRYS 0.6 mg tablet Generic drug:  colchicine  
take 1 tablet by mouth once daily if needed repeat IF  2 HOURS LATER  
  
 furosemide 40 mg tablet Commonly known as:  LASIX Take  by mouth daily. gabapentin 300 mg capsule Commonly known as:  NEURONTIN Take 1 Cap by mouth three (3) times daily. HYDROcodone-acetaminophen 5-325 mg per tablet Commonly known as:  [address]  
take 1 tablet by mouth every 4 to 6 hours if needed for pain Lactobac comb 3-FOS-pantethine 300-250 million cell-mg Cap Take  by mouth.  
  
 naloxone 4 mg/actuation nasal spray Commonly known as:  NARCAN  
by Nasal route. potassium chloride 20 mEq tablet Commonly known as:  K-DUR, KLOR-CON  
 TAKE 1 TABLET TWICE A DAY FOR THREE DAYS ONLY ,THEN DECREASE TO 1 TABLET DAILY THEREAFTER  
  
 predniSONE 10 mg tablet Commonly known as:  DELTASONE  
  
 sotalol 120 mg tablet Commonly known as:  BETAPACE  
take 1 tablet by mouth every 12 hours for HEART RATE CONTROL  
  
 tapentadol 150 mg Tb12 Take 150 mg by mouth. topiramate 50 mg tablet Commonly known as:  TOPAMAX  
take 1 tablet by mouth every 8 hours TYLENOL PO Take  by mouth. VACUUM ERECTION DEVICE SYSTEM  
by Does Not Apply route. vardenafil 10 mg Tbdi  
Commonly known as:  STAXYN  
10 mg by TransLINGual route daily as needed for 4 doses. VIAGRA PO Take  by mouth. ZOCOR 10 mg tablet Generic drug:  simvastatin Take  by mouth nightly. Follow-up Instructions Return in about 6 weeks (around 5/14/2018). Introducing hospitals & [facility] SERVICES! [employer] introduces [employer] patient portal. Now you can access parts of your medical record, email your doctor's office, and request medication refills online. 1. In your internet browser, go to [email or web address] 2. Click on the First Time User? Click Here link in the Sign In box. You will see the New Member Sign Up page. 3. Enter your [employer] Access Code exactly as it appears below. You will not need to use this code after youve completed the sign-up process. If you do not sign up before the expiration date, you must request a new code. · [employer] Access Code: [email or web address] Expires: 5/6/2018  3:36 PM 
 
4. Enter the last four digits of your Social Security Number (xxxx) and Date of Birth (mm/dd/yyyy) as indicated and click Submit. You will be taken to the next sign-up page. 5. Create a [employer] ID. This will be your [employer] login ID and cannot be changed, so think of one that is secure and easy to remember. 6. Create a Last Sizet password. You can change your password at any time. 7. Enter your Password Reset Question and Answer. This can be used at a later time if you forget your password. 8. Enter your e-mail address. You will receive e-mail notification when new information is available in [address] 9. Click Sign Up. You can now view and download portions of your medical record. 10. Click the Download Summary menu link to download a portable copy of your medical information. If you have questions, please visit the Frequently Asked Questions section of the [employer] website. Remember, [employer] is NOT to be used for urgent needs. For medical emergencies, dial 911. Now available from your iPhone and Android! Please provide this summary of care documentation to your next provider. Your primary care clinician is listed as Abraham Zamora. If you have any questions after today's visit, please call [phone].

## 2018-05-14 ENCOUNTER — OFFICE VISIT (OUTPATIENT)
Dept: ORTHOPEDIC SURGERY | Age: 63
End: 2018-05-14

## 2018-05-14 VITALS
OXYGEN SATURATION: 98 % | BODY MASS INDEX: 37.98 KG/M2 | TEMPERATURE: 98.8 F | DIASTOLIC BLOOD PRESSURE: 80 MMHG | HEIGHT: 73 IN | SYSTOLIC BLOOD PRESSURE: 122 MMHG | RESPIRATION RATE: 18 BRPM | WEIGHT: 286.6 LBS | HEART RATE: 66 BPM

## 2018-05-14 DIAGNOSIS — M48.062 SPINAL STENOSIS, LUMBAR REGION WITH NEUROGENIC CLAUDICATION: Primary | ICD-10-CM

## 2018-05-14 DIAGNOSIS — M51.36 DDD (DEGENERATIVE DISC DISEASE), LUMBAR: ICD-10-CM

## 2018-05-14 DIAGNOSIS — M54.16 LUMBAR RADICULOPATHY: ICD-10-CM

## 2018-05-14 DIAGNOSIS — M51.26 HNP (HERNIATED NUCLEUS PULPOSUS), LUMBAR: ICD-10-CM

## 2018-05-14 NOTE — MR AVS SNAPSHOT
[facility] [place] 
 
 
 [address] [facility] 
[phone] Patient: Isidro Vigil Sr. MRN: [id number] Visit Information Date & Time Provider Department Dept. Phone Encounter #  
 5/14/2018 11:20 AM Tammy Peralta MD [address] [facility] [place] [phone] [id number] Follow-up Instructions Return in about 4 weeks (around 6/11/2018). Follow-up and Disposition History Your Appointments 5/14/2018  2:00 PM  
Office Visit with Je Andrews MD  
[facility]) Appt Note: 1 yr f/u with PSA; 3/9/18 LM to resched. appt due to [facility] being in surgery -KW  
 [address] [facility], [address] [facility] [address]  
  
   
 [address] [facility], [address] [facility] [address] Upcoming Health Maintenance Date Due Hepatitis C Screening 1955 Pneumococcal 19-64 Highest Risk (1 of 3 - PCV13) 11/11/1974 FOBT Q 1 YEAR AGE 50-75 11/11/2005 Influenza Age 5 to Adult 8/1/2018 DTaP/Tdap/Td series (2 - Td) 8/22/2021 Allergies as of 5/14/2018  Review Complete On: 5/14/2018 By: Jenny Nose, LPN Severity Noted Reaction Type Reactions Topamax [Topiramate]  02/06/2018    Other (comments) Weakness and pain Current Immunizations  Never Reviewed Name Date Influenza Vaccine 1/25/2016 12:00 AM, 1/16/2015 12:00 AM  
 Influenza Vaccine PF 1/16/2015 12:00 AM  
 Tdap 8/22/2011 12:00 AM  
 Zoster Vaccine, Live 1/25/2016 12:00 AM  
  
 Not reviewed this visit You Were Diagnosed With   
  
 Codes Comments Spinal stenosis, lumbar region with neurogenic claudication    -  Primary ICD-10-CM: M48.062 
ICD-9-CM: 724.03   
 HNP (herniated nucleus pulposus), lumbar     ICD-10-CM: M51.26 
ICD-9-CM: 722.10 Lumbar radiculopathy     ICD-10-CM: M54.16 
ICD-9-CM: 724.4 DDD (degenerative disc disease), lumbar     ICD-10-CM: M51.36 
ICD-9-CM: 722.52 Vitals BP Pulse Temp Resp Height(growth percentile) Weight(growth percentile) 122/80 66 98.8 °F (37.1 °C) (Oral) 18 6' 1\" (1.854 m) 286 lb 9.6 oz (130 kg) SpO2 BMI Smoking Status 98% 37.81 kg/m2 Never Smoker BMI and BSA Data Body Mass Index Body Surface Area  
 37.81 kg/m 2 2.59 m 2 Preferred Pharmacy Pharmacy Name Phone [address], [address] [facility] [address] [phone] Your Updated Medication List  
  
   
This list is accurate as of 5/14/18 11:42 AM.  Always use your most recent med list.  
  
  
  
  
 allopurinol 100 mg tablet Commonly known as:  ZYLOPRIM  
100 mg. amLODIPine 5 mg tablet Commonly known as:  NORVASC  
take 1 tablet by mouth once daily  
  
 apixaban 5 mg tablet Commonly known as:  Sumaya Ramus Take 5 mg by mouth. atenolol 50 mg tablet Commonly known as:  TENORMIN Take 50 mg by mouth daily. avanafil 100 mg tablet Commonly known as:  CXADCHC Take 1 Tab by mouth as needed for Other. Take one tablet an hour prior to intercourse  
  
 chlorthalidone 25 mg tablet Commonly known as:  HYGROTEN  
take 1/2 tablet by mouth once daily COLCRYS 0.6 mg tablet Generic drug:  colchicine  
take 1 tablet by mouth once daily if needed repeat IF  2 HOURS LATER  
  
 furosemide 40 mg tablet Commonly known as:  LASIX Take  by mouth daily. gabapentin 300 mg capsule Commonly known as:  NEURONTIN Take 1 Cap by mouth three (3) times daily. HYDROcodone-acetaminophen 5-325 mg per tablet Commonly known as:  Hurman Grist  
take 1 tablet by mouth every 4 to 6 hours if needed for pain Lactobac comb 3-FOS-pantethine 300-250 million cell-mg Cap Take  by mouth.  
  
 naloxone 4 mg/actuation nasal spray Commonly known as:  NARCAN  
by Nasal route. potassium chloride 20 mEq tablet Commonly known as:  K-DUR, KLOR-CON  
TAKE 1 TABLET TWICE A DAY FOR THREE DAYS ONLY ,THEN DECREASE TO 1 TABLET DAILY THEREAFTER  
  
 predniSONE 10 mg tablet Commonly known as:  DELTASONE  
  
 sotalol 120 mg tablet Commonly known as:  BETAPACE  
take 1 tablet by mouth every 12 hours for HEART RATE CONTROL  
  
 tapentadol 150 mg Tb12 Take 150 mg by mouth. topiramate 50 mg tablet Commonly known as:  TOPAMAX  
take 1 tablet by mouth every 8 hours TYLENOL PO Take  by mouth. VACUUM ERECTION DEVICE SYSTEM  
by Does Not Apply route. vardenafil 10 mg Tbdi  
Commonly known as:  STAXYN  
10 mg by TransLINGual route daily as needed for 4 doses. VIAGRA PO Take  by mouth. ZOCOR 10 mg tablet Generic drug:  simvastatin Take  by mouth nightly. Follow-up Instructions Return in about 4 weeks (around 6/11/2018). Introducing [facility] SERVICES! [employer] introduces [employer] patient portal. Now you can access parts of your medical record, email your doctor's office, and request medication refills online. 1. In your internet browser, go to [email or web address] 2. Click on the First Time User? Click Here link in the Sign In box. You will see the New Member Sign Up page. 3. Enter your [employer] Access Code exactly as it appears below. You will not need to use this code after youve completed the sign-up process. If you do not sign up before the expiration date, you must request a new code. · [employer] Access Code: [email or web address] Expires: 8/12/2018 11:18 AM 
 
4. Enter the last four digits of your Social Security Number (xxxx) and Date of Birth (mm/dd/yyyy) as indicated and click Submit. You will be taken to the next sign-up page. 5. Create a [employer] ID. This will be your [employer] login ID and cannot be changed, so think of one that is secure and easy to remember. 6. Create a Sols password. You can change your password at any time. 7. Enter your Password Reset Question and Answer. This can be used at a later time if you forget your password. 8. Enter your e-mail address. You will receive e-mail notification when new information is available in [address] 9. Click Sign Up. You can now view and download portions of your medical record. 10. Click the Download Summary menu link to download a portable copy of your medical information. If you have questions, please visit the Frequently Asked Questions section of the [employer] website. Remember, [employer] is NOT to be used for urgent needs. For medical emergencies, dial 911. Now available from your iPhone and Android! Please provide this summary of care documentation to your next provider. Your primary care clinician is listed as Daniel Lyles. If you have any questions after today's visit, please call [phone].

## 2018-05-14 NOTE — PROGRESS NOTES
[facility]  [address]  [address], [address]   Phone: [phone]  Fax: [phone]        PROGRESS NOTE      HISTORY OF PRESENT ILLNESS:  The patient is a 58 y.o. male and was seen today for follow up of low back pain extending into his RLE in an L5 distribution to the ankle since late-January 2018. He states RLE pain is most severe at this time. Denies injury/trauma. His pain is aggravated with any activity/position. Per patient, he remotely saw Dr. Ellen Valdivia but was not in pain management. At that time, Dr. Ellen Valdivia performed a right knee injection. Pt underwent right-sided L4/5 SNRB on 2/13/18 with improvement in symptoms. Pt was prescribed MDP by her PCP without relief. He reports he was also taking TOPAMAX in the past without benefit, subsequently he discontinued the medication. Pt was prescribed Norco but reports he never filled the prescription. PMHx includes prostate cancer (no chemotherapy), and A-fib (Eliquis). Pt called our office stating the increased dose of Neurontin 600 mg TID was causing cardiac issues. Per patient, his cardiologist believed his cardiac issues were related to his A-fib and discussed an ablation. Pt denies h/o lumbar spinal surgery. Pt denies change in bowel or bladder habits. The patient is RHD. Lumbar spine MRI dated 12/26/17 reviewed. Per report, L3-L4 moderate/severe concentric stenosis. Super right paracentral inferiorly migrating disc extrusion which more focally impinges traversing right-sided nerve roots. Moderate/severe right and moderate left-sided neural foraminal narrowing. Disc extrusion may contact the existing right l3 nerve root. L4-L moderate concentric stenosis and foraminal narrowing. Mild disc changes at l1-L2, L2-L3, and l5-S1. Loss of disc space height. Facet degenerative disease, moderate at L4-L5. At his last clinic appointment, patient should complete physical therapy as prescribed and continue with his HEP daily.  He was doing well on Neurontin 300 mg TID. The patient returns today with pain location and distribution remain unchanged. He continues to his pain 1/10. Pt continues to be enrolled in physical therapy. He states he takes Neurontin 300 mg 3-4x/week prn.  reviewed. Body mass index is 37.81 kg/(m^2). PCP: Mary Ayala MD      Past Medical History:   Diagnosis Date    Arthropathy     ED (erectile dysfunction)     Essential hypertension     Heart murmur     Hypercholesteremia     Malignant neoplasm of prostate ([facility] [address]     [id number] nikolas 3 + 3 adenoacarcinoma of the prostate s/p DVP 4/18/08    ALLAN (stress urinary incontinence), male     Ulcer         Social History     Social History    Marital status:      Spouse name: N/A    Number of children: N/A    Years of education: N/A     Occupational History    Not on file. Social History Main Topics    Smoking status: Never Smoker    Smokeless tobacco: Never Used    Alcohol use No    Drug use: No    Sexual activity: Yes     Partners: Female     Other Topics Concern    Not on file     Social History Narrative       Current Outpatient Prescriptions   Medication Sig Dispense Refill    potassium chloride (K-DUR, KLOR-CON) 20 mEq tablet TAKE 1 TABLET TWICE A DAY FOR THREE DAYS ONLY ,THEN DECREASE TO 1 TABLET DAILY THEREAFTER  0    apixaban (ELIQUIS) 5 mg tablet Take 5 mg by mouth.       amLODIPine (NORVASC) 5 mg tablet take 1 tablet by mouth once daily  0    chlorthalidone (HYGROTEN) 25 mg tablet take 1/2 tablet by mouth once daily  0    COLCRYS 0.6 mg tablet take 1 tablet by mouth once daily if needed repeat IF  2 HOURS LATER  0    HYDROcodone-acetaminophen (NORCO) 5-325 mg per tablet take 1 tablet by mouth every 4 to 6 hours if needed for pain  0    sotalol (BETAPACE) 120 mg tablet take 1 tablet by mouth every 12 hours for HEART RATE CONTROL  0    predniSONE (DELTASONE) 10 mg tablet   0    SILDENAFIL CITRATE (VIAGRA PO) Take  by mouth.  ACETAMINOPHEN (TYLENOL PO) Take  by mouth.  simvastatin (ZOCOR) 10 mg tablet Take  by mouth nightly.  VACUUM ERECTION DEVICE SYSTEM by Does Not Apply route.  topiramate (TOPAMAX) 50 mg tablet take 1 tablet by mouth every 8 hours  0    Lactobac comb 3-FOS-pantethine 300-250 million cell-mg cap Take  by mouth.  naloxone (NARCAN) 4 mg/actuation nasal spray by Nasal route.  tapentadol 150 mg Tb12 Take 150 mg by mouth.  gabapentin (NEURONTIN) 300 mg capsule Take 1 Cap by mouth three (3) times daily. 90 Cap 1    furosemide (LASIX) 40 mg tablet Take  by mouth daily.  allopurinol (ZYLOPRIM) 100 mg tablet 100 mg.  0    avanafil (STENDRA) 100 mg tablet Take 1 Tab by mouth as needed for Other. Take one tablet an hour prior to intercourse (Patient not taking: Reported on 3/5/2018) 6 Tab 11    vardenafil (STAXYN) 10 mg TbDL 10 mg by TransLINGual route daily as needed for 4 doses. (Patient not taking: Reported on 3/5/2018) 10 Tab 5    atenolol (TENORMIN) 50 mg tablet Take 50 mg by mouth daily. Allergies   Allergen Reactions    Topamax [Topiramate] Other (comments)     Weakness and pain          PHYSICAL EXAMINATION    Visit Vitals    /80    Pulse 66    Temp 98.8 °F (37.1 °C) (Oral)    Resp 18    Ht 6' 1\" (1.854 m)    Wt 130 kg (286 lb 9.6 oz)    SpO2 98%    BMI 37.81 kg/m2       CONSTITUTIONAL: NAD, A&O x 3  SENSATION: Intact to light touch throughout  RANGE OF MOTION: The patient has full passive range of motion in all four extremities. MOTOR:  Straight Leg Raise: Negative, bilateral               Hip Flex Knee Ext Knee Flex Ankle DF GTE Ankle PF Tone   Right +4/5 +4/5 +4/5 +4/5 +4/5 +4/5 +4/5   Left +4/5 +4/5 +4/5 +4/5 +4/5 +4/5 +4/5       ASSESSMENT   Diagnoses and all orders for this visit:    1. Spinal stenosis, lumbar region with neurogenic claudication    2. HNP (herniated nucleus pulposus), lumbar    3. Lumbar radiculopathy    4. DDD (degenerative disc disease), lumbar          IMPRESSION AND PLAN:  I have instructed patient to take Neurontin 300 mg TID and not prn. Patient advised to call the office if intolerant to medication. I encourage patient to perform his HEP daily. I will see the patient back in 1 month's time or earlier if needed. Written by Parker Chopra, as dictated by Shreya Hills MD  I examined the patient, reviewed and agree with the note.

## 2018-06-12 ENCOUNTER — OFFICE VISIT (OUTPATIENT)
Dept: ORTHOPEDIC SURGERY | Age: 63
End: 2018-06-12

## 2018-06-12 VITALS
WEIGHT: 280 LBS | HEART RATE: 54 BPM | BODY MASS INDEX: 37.11 KG/M2 | SYSTOLIC BLOOD PRESSURE: 125 MMHG | HEIGHT: 73 IN | DIASTOLIC BLOOD PRESSURE: 73 MMHG

## 2018-06-12 DIAGNOSIS — M54.16 LUMBAR RADICULOPATHY: ICD-10-CM

## 2018-06-12 DIAGNOSIS — M48.062 SPINAL STENOSIS, LUMBAR REGION WITH NEUROGENIC CLAUDICATION: Primary | ICD-10-CM

## 2018-06-12 DIAGNOSIS — M51.36 DDD (DEGENERATIVE DISC DISEASE), LUMBAR: ICD-10-CM

## 2018-06-12 DIAGNOSIS — M51.26 HNP (HERNIATED NUCLEUS PULPOSUS), LUMBAR: ICD-10-CM

## 2018-06-12 PROBLEM — E66.01 SEVERE OBESITY (BMI 35.0-39.9): Status: ACTIVE | Noted: 2018-06-12

## 2018-06-12 RX ORDER — GABAPENTIN 300 MG/1
300 CAPSULE ORAL 3 TIMES DAILY
Qty: 270 CAP | Refills: 1 | Status: SHIPPED | OUTPATIENT
Start: 2018-06-12 | End: 2019-06-26

## 2018-06-12 NOTE — PROGRESS NOTES
[facility]  [address], [place]   Phone: [phone]  Fax: [phone]        PROGRESS NOTE      HISTORY OF PRESENT ILLNESS:  The patient is a 58 y.o. male and was seen today for follow up of low back pain extending into his RLE in an L5 distribution to the ankle since late-January 2018. He states RLE pain is most severe at this time. Denies injury/trauma. His pain is aggravated with any activity/position. Per patient, he remotely saw Dr. Rhonda Sandhu but was not in pain management. At that time, Dr. Rhonda Sandhu performed a right knee injection. Pt underwent right-sided L4/5 SNRB on 2/13/18 with improvement in symptoms. Pt was prescribed MDP by her PCP without relief. He reports he was also taking TOPAMAX in the past without benefit, subsequently he discontinued the medication. Pt called our office stating the increased dose of Neurontin 600 mg TID was causing cardiac issues. Pt was prescribed Norco but reports he never filled the prescription. PMHx includes prostate cancer (no chemotherapy), and A-fib (Eliquis). Per patient, his cardiologist believed his cardiac issues were related to his A-fib and discussed an ablation. Pt denies h/o lumbar spinal surgery. Pt denies change in bowel or bladder habits. The patient is RHD. Lumbar spine MRI dated 12/26/17 reviewed. Per report, L3-L4 moderate/severe concentric stenosis. Super right paracentral inferiorly migrating disc extrusion which more focally impinges traversing right-sided nerve roots. Moderate/severe right and moderate left-sided neural foraminal narrowing. Disc extrusion may contact the existing right l3 nerve root. L4-L moderate concentric stenosis and foraminal narrowing. Mild disc changes at l1-L2, L2-L3, and l5-S1. Loss of disc space height. Facet degenerative disease, moderate at L4-L5. At his last clinic appointment, I instructed patient to take Neurontin 300 mg TID and not prn.  I encouraged patient to perform his HEP daily. The patient returns today with pain location and distribution remain unchanged. He rates his pain 0.5/10, consistent with his last visit, (1/10). Pt completed physical therapy and completes HEP daily. Pt is tolerating Neurontin 300 mg TID with good benefit.  reviewed. Body mass index is 36.94 kg/(m^2). PCP: Christiano Garcia MD      Past Medical History:   Diagnosis Date    Arthropathy     ED (erectile dysfunction)     Essential hypertension     Heart murmur     Hypercholesteremia     Malignant neoplasm of prostate ([facility] [address]     [id number] nikolas 3 + 3 adenoacarcinoma of the prostate s/p DVP 4/18/08    ALLAN (stress urinary incontinence), male     Ulcer         Social History     Social History    Marital status:      Spouse name: N/A    Number of children: N/A    Years of education: N/A     Occupational History    Not on file. Social History Main Topics    Smoking status: Never Smoker    Smokeless tobacco: Never Used    Alcohol use No    Drug use: No    Sexual activity: Yes     Partners: Female     Other Topics Concern    Not on file     Social History Narrative       Current Outpatient Prescriptions   Medication Sig Dispense Refill    gabapentin (NEURONTIN) 300 mg capsule Take 1 Cap by mouth three (3) times daily. Indications: NEUROPATHIC PAIN 270 Cap 1    potassium chloride (K-DUR, KLOR-CON) 20 mEq tablet TAKE 1 TABLET TWICE A DAY FOR THREE DAYS ONLY ,THEN DECREASE TO 1 TABLET DAILY THEREAFTER  0    apixaban (ELIQUIS) 5 mg tablet Take 5 mg by mouth.       amLODIPine (NORVASC) 5 mg tablet take 1 tablet by mouth once daily  0    chlorthalidone (HYGROTEN) 25 mg tablet take 1/2 tablet by mouth once daily  0    COLCRYS 0.6 mg tablet take 1 tablet by mouth once daily if needed repeat IF  2 HOURS LATER  0    HYDROcodone-acetaminophen (NORCO) 5-325 mg per tablet take 1 tablet by mouth every 4 to 6 hours if needed for pain  0    sotalol (BETAPACE) 120 mg tablet take 1 tablet by mouth every 12 hours for HEART RATE CONTROL  0    naloxone (NARCAN) 4 mg/actuation nasal spray by Nasal route.  allopurinol (ZYLOPRIM) 100 mg tablet 100 mg.  0    SILDENAFIL CITRATE (VIAGRA PO) Take  by mouth.  ACETAMINOPHEN (TYLENOL PO) Take  by mouth.  simvastatin (ZOCOR) 10 mg tablet Take  by mouth nightly.  VACUUM ERECTION DEVICE SYSTEM by Does Not Apply route.  topiramate (TOPAMAX) 50 mg tablet take 1 tablet by mouth every 8 hours  0    Lactobac comb 3-FOS-pantethine 300-250 million cell-mg cap Take  by mouth.  tapentadol 150 mg Tb12 Take 150 mg by mouth.  furosemide (LASIX) 40 mg tablet Take  by mouth daily.  predniSONE (DELTASONE) 10 mg tablet   0    atenolol (TENORMIN) 50 mg tablet Take 50 mg by mouth daily. Allergies   Allergen Reactions    Topamax [Topiramate] Other (comments)     Weakness and pain          PHYSICAL EXAMINATION    Visit Vitals    /73    Pulse (!) 54    Ht 6' 1\" (1.854 m)    Wt 127 kg (280 lb)    BMI 36.94 kg/m2       CONSTITUTIONAL: NAD, A&O x 3  SENSATION: Intact to light touch throughout  RANGE OF MOTION: The patient has full passive range of motion in all four extremities. MOTOR:  Straight Leg Raise: Negative, bilateral               Hip Flex Knee Ext Knee Flex Ankle DF GTE Ankle PF Tone   Right +4/5 +4/5 +4/5 +4/5 +4/5 +4/5 +4/5   Left +4/5 +4/5 +4/5 +4/5 +4/5 +4/5 +4/5       ASSESSMENT   Diagnoses and all orders for this visit:    1. Spinal stenosis, lumbar region with neurogenic claudication  -     gabapentin (NEURONTIN) 300 mg capsule; Take 1 Cap by mouth three (3) times daily. Indications: NEUROPATHIC PAIN    2. HNP (herniated nucleus pulposus), lumbar  -     gabapentin (NEURONTIN) 300 mg capsule; Take 1 Cap by mouth three (3) times daily. Indications: NEUROPATHIC PAIN    3. Lumbar radiculopathy  -     gabapentin (NEURONTIN) 300 mg capsule; Take 1 Cap by mouth three (3) times daily. Indications: NEUROPATHIC PAIN    4. DDD (degenerative disc disease), lumbar  -     gabapentin (NEURONTIN) 300 mg capsule; Take 1 Cap by mouth three (3) times daily. Indications: NEUROPATHIC PAIN          IMPRESSION AND PLAN:  Patient wished to continue his current treatment. He was provided with refills of his Neurontin 300 mg TID. I encourage patient to perform his HEP daily. I will see the patient back in 6 month's time or earlier if needed. Written by Nikki Bonilla, as dictated by Kimberli Berry MD  I examined the patient, reviewed and agree with the note.

## 2018-06-12 NOTE — MR AVS SNAPSHOT
[address] [facility] 
 
 
 [address] [facility] 
[phone] Patient: Germán Jenkins Sr. MRN: [id number] STP:[id number][birth year]19/[id number] Visit Information Date & Time Provider Department Dept. Phone Encounter #  
 6/12/2018 11:00 AM Samantha Rogel MD [address] [facility] [place] [phone] [id number] Follow-up Instructions Return in about 6 months (around 12/12/2018). Your Appointments 6/27/2018  1:00 PM  
PROCEDURE with Pat Kyle MD  
[facility] [address]) Appt Note: first avail cysto in 1 month times frame [address] [facility], [place] [address]  
  
   
 [address] [facility], [address] [facility] [address] Upcoming Health Maintenance Date Due Hepatitis C Screening 1955 Pneumococcal 19-64 Highest Risk (1 of 3 - PCV13) 11/11/1974 FOBT Q 1 YEAR AGE 50-75 11/11/2005 Influenza Age 5 to Adult 8/1/2018 DTaP/Tdap/Td series (2 - Td) 8/22/2021 Allergies as of 6/12/2018  Review Complete On: 6/12/2018 By: Samantha Rogel MD  
  
 Severity Noted Reaction Type Reactions Topamax [Topiramate]  02/06/2018    Other (comments) Weakness and pain Current Immunizations  Never Reviewed Name Date Influenza Vaccine 1/25/2016 12:00 AM, 1/16/2015 12:00 AM  
 Influenza Vaccine PF 1/16/2015 12:00 AM  
 Tdap 8/22/2011 12:00 AM  
 Zoster Vaccine, Live 1/25/2016 12:00 AM  
  
 Not reviewed this visit You Were Diagnosed With   
  
 Codes Comments Spinal stenosis, lumbar region with neurogenic claudication    -  Primary ICD-10-CM: M48.062 
ICD-9-CM: 724.03   
 HNP (herniated nucleus pulposus), lumbar     ICD-10-CM: M51.26 
ICD-9-CM: 722.10 Lumbar radiculopathy     ICD-10-CM: M54.16 
ICD-9-CM: 724.4 DDD (degenerative disc disease), lumbar     ICD-10-CM: M51.36 
ICD-9-CM: 722.52 Vitals BP Pulse Height(growth percentile) Weight(growth percentile) BMI Smoking Status 125/73 (!) 54 6' 1\" (1.854 m) 280 lb (127 kg) 36.94 kg/m2 Never Smoker Vitals History BMI and BSA Data Body Mass Index Body Surface Area  
 36.94 kg/m 2 2.56 m 2 Preferred Pharmacy Pharmacy Name Phone [address], [address], [address] / [address] Your Updated Medication List  
  
   
This list is accurate as of 6/12/18 12:29 PM.  Always use your most recent med list.  
  
  
  
  
 allopurinol 100 mg tablet Commonly known as:  ZYLOPRIM  
100 mg. amLODIPine 5 mg tablet Commonly known as:  NORVASC  
take 1 tablet by mouth once daily  
  
 apixaban 5 mg tablet Commonly known as:  Donnise Or Take 5 mg by mouth. atenolol 50 mg tablet Commonly known as:  TENORMIN Take 50 mg by mouth daily. chlorthalidone 25 mg tablet Commonly known as:  HYGROTEN  
take 1/2 tablet by mouth once daily COLCRYS 0.6 mg tablet Generic drug:  colchicine  
take 1 tablet by mouth once daily if needed repeat IF  2 HOURS LATER  
  
 furosemide 40 mg tablet Commonly known as:  LASIX Take  by mouth daily. gabapentin 300 mg capsule Commonly known as:  NEURONTIN Take 1 Cap by mouth three (3) times daily. Indications: NEUROPATHIC PAIN  
  
 HYDROcodone-acetaminophen 5-325 mg per tablet Commonly known as:  Jessica Divine  
take 1 tablet by mouth every 4 to 6 hours if needed for pain Lactobac comb 3-FOS-pantethine 300-250 million cell-mg Cap Take  by mouth.  
  
 naloxone 4 mg/actuation nasal spray Commonly known as:  NARCAN  
by Nasal route. potassium chloride 20 mEq tablet Commonly known as:  K-DUR, KLOR-CON  
TAKE 1 TABLET TWICE A DAY FOR THREE DAYS ONLY ,THEN DECREASE TO 1 TABLET DAILY THEREAFTER  
  
 predniSONE 10 mg tablet Commonly known as:  DELTASONE  
  
 sotalol 120 mg tablet Commonly known as:  BETAPACE  
take 1 tablet by mouth every 12 hours for HEART RATE CONTROL  
  
 tapentadol 150 mg Tb12 Take 150 mg by mouth. topiramate 50 mg tablet Commonly known as:  TOPAMAX  
take 1 tablet by mouth every 8 hours TYLENOL PO Take  by mouth. VACUUM ERECTION DEVICE SYSTEM  
by Does Not Apply route. VIAGRA PO Take  by mouth. ZOCOR 10 mg tablet Generic drug:  simvastatin Take  by mouth nightly. Prescriptions Printed Refills  
 gabapentin (NEURONTIN) 300 mg capsule 1 Sig: Take 1 Cap by mouth three (3) times daily. Indications: NEUROPATHIC PAIN Class: Print Route: Oral  
  
Follow-up Instructions Return in about 6 months (around 12/12/2018). Introducing [facility] SERVICES! Roel Jasso introduces Stretch patient portal. Now you can access parts of your medical record, email your doctor's office, and request medication refills online. 1. In your internet browser, go to [email or web address] 2. Click on the First Time User? Click Here link in the Sign In box. You will see the New Member Sign Up page. 3. Enter your Stretch Access Code exactly as it appears below. You will not need to use this code after youve completed the sign-up process. If you do not sign up before the expiration date, you must request a new code. · Stretch Access Code: [email or web address] Expires: 8/12/2018 11:18 AM 
 
4. Enter the last four digits of your Social Security Number (xxxx) and Date of Birth (mm/dd/yyyy) as indicated and click Submit. You will be taken to the next sign-up page. 5. Create a Tus reQRdost ID. This will be your Stretch login ID and cannot be changed, so think of one that is secure and easy to remember. 6. Create a Stretch password. You can change your password at any time. 7. Enter your Password Reset Question and Answer. This can be used at a later time if you forget your password. 8. Enter your e-mail address. You will receive e-mail notification when new information is available in [address] 9. Click Sign Up. You can now view and download portions of your medical record. 10. Click the Download Summary menu link to download a portable copy of your medical information. If you have questions, please visit the Frequently Asked Questions section of the The Local website. Remember, The Local is NOT to be used for urgent needs. For medical emergencies, dial 911. Now available from your iPhone and Android! Please provide this summary of care documentation to your next provider. Your primary care clinician is listed as Gera Painter. If you have any questions after today's visit, please call [phone].

## 2018-12-12 ENCOUNTER — OFFICE VISIT (OUTPATIENT)
Dept: ORTHOPEDIC SURGERY | Age: 63
End: 2018-12-12

## 2018-12-12 VITALS
HEIGHT: 73 IN | SYSTOLIC BLOOD PRESSURE: 134 MMHG | DIASTOLIC BLOOD PRESSURE: 82 MMHG | OXYGEN SATURATION: 98 % | WEIGHT: 289 LBS | HEART RATE: 65 BPM | BODY MASS INDEX: 38.3 KG/M2 | TEMPERATURE: 98.4 F

## 2018-12-12 DIAGNOSIS — M54.16 LUMBAR RADICULOPATHY: ICD-10-CM

## 2018-12-12 DIAGNOSIS — M51.26 OTHER INTERVERTEBRAL DISC DISPLACEMENT, LUMBAR REGION: ICD-10-CM

## 2018-12-12 DIAGNOSIS — M51.36 DDD (DEGENERATIVE DISC DISEASE), LUMBAR: ICD-10-CM

## 2018-12-12 DIAGNOSIS — M48.062 SPINAL STENOSIS, LUMBAR REGION WITH NEUROGENIC CLAUDICATION: Primary | ICD-10-CM

## 2018-12-12 RX ORDER — GABAPENTIN 300 MG/1
300 CAPSULE ORAL 3 TIMES DAILY
Qty: 270 CAP | Refills: 1 | Status: SHIPPED | OUTPATIENT
Start: 2018-12-12 | End: 2019-06-26 | Stop reason: SDUPTHER

## 2018-12-12 NOTE — PROGRESS NOTES
[facility]  [address], [place]   Phone: [phone]  Fax: [phone]        PROGRESS NOTE      HISTORY OF PRESENT ILLNESS:  The patient is a 61 y.o. male and was seen today for follow up of low back pain extending into his RLE in an L5 distribution to the ankle since late-January 2018. He states RLE pain is most severe at this time. Denies injury/trauma. His pain is aggravated with any activity/position. Per patient, he remotely saw Dr. Alban Haskins but was not in pain management. At that time, Dr. Alban Haskins performed a right knee injection. Pt underwent right-sided L4/5 SNRB on 2/13/18 with improvement in symptoms. Pt was prescribed MDP by her PCP without relief. He reports he was also taking TOPAMAX in the past without benefit, subsequently he discontinued the medication. Pt called our office stating the increased dose of Neurontin 600 mg TID was causing cardiac issues. Pt was prescribed Norco but reports he never filled the prescription. PMHx includes prostate cancer (no chemotherapy), and A-fib (Eliquis). Per patient, his cardiologist believed his cardiac issues were related to his A-fib and discussed an ablation. Pt completed physical therapy. Pt denies h/o lumbar spinal surgery. Pt denies change in bowel or bladder habits. The patient is RHD. Lumbar spine MRI dated 12/26/17 reviewed. Per report, L3-L4 moderate/severe concentric stenosis. Super right paracentral inferiorly migrating disc extrusion which more focally impinges traversing right-sided nerve roots. Moderate/severe right and moderate left-sided neural foraminal narrowing. Disc extrusion may contact the existing right l3 nerve root. L4-L moderate concentric stenosis and foraminal narrowing. Mild disc changes at l1-L2, L2-L3, and l5-S1. Loss of disc space height. Facet degenerative disease, moderate at L4-L5. At his last clinical appointment, patient wished to continue his current treatment.  He was provided with refills of his Neurontin 300 mg TID. The patient returns today with no pain complaints x 3 months. He rates his pain 0/10, a slight decrease from his last visit (0.5/10). He is compliant with Neurontin 300 mg TID. He completes HEP daily. Pt denies change in bowel or bladder habits.  reviewed. Body mass index is 38.13 kg/m². PCP: Linda Ram MD      Past Medical History:   Diagnosis Date    Arthropathy     ED (erectile dysfunction)     Essential hypertension     Heart murmur     Hypercholesteremia     Malignant neoplasm of prostate ([facility] [address]     [id number] nikolas 3 + 3 adenoacarcinoma of the prostate s/p DVP 4/18/08    ALLAN (stress urinary incontinence), male     Ulcer         Social History     Socioeconomic History    Marital status:      Spouse name: Not on file    Number of children: Not on file    Years of education: Not on file    Highest education level: Not on file   Social Needs    Financial resource strain: Not on file    Food insecurity - worry: Not on file    Food insecurity - inability: Not on file   [employer] needs - medical: Not on file   [employer] needs - non-medical: Not on file   Occupational History    Not on file   Tobacco Use    Smoking status: Never Smoker    Smokeless tobacco: Never Used   Substance and Sexual Activity    Alcohol use: No    Drug use: No    Sexual activity: Yes     Partners: Female   Other Topics Concern    Not on file   Social History Narrative    Not on file       Current Outpatient Medications   Medication Sig Dispense Refill    gabapentin (NEURONTIN) 300 mg capsule Take 1 Cap by mouth three (3) times daily. Indications: NEUROPATHIC PAIN 270 Cap 1    potassium chloride (K-DUR, KLOR-CON) 20 mEq tablet TAKE 1 TABLET TWICE A DAY FOR THREE DAYS ONLY ,THEN DECREASE TO 1 TABLET DAILY THEREAFTER  0    apixaban (ELIQUIS) 5 mg tablet Take 5 mg by mouth.       amLODIPine (NORVASC) 5 mg tablet take 1 tablet by mouth once daily  0    chlorthalidone (HYGROTEN) 25 mg tablet take 1/2 tablet by mouth once daily  0    COLCRYS 0.6 mg tablet take 1 tablet by mouth once daily if needed repeat IF  2 HOURS LATER  0    HYDROcodone-acetaminophen (NORCO) 5-325 mg per tablet take 1 tablet by mouth every 4 to 6 hours if needed for pain  0    sotalol (BETAPACE) 120 mg tablet take 1 tablet by mouth every 12 hours for HEART RATE CONTROL  0    topiramate (TOPAMAX) 50 mg tablet take 1 tablet by mouth every 8 hours  0    Lactobac comb 3-FOS-pantethine 300-250 million cell-mg cap Take  by mouth.  naloxone (NARCAN) 4 mg/actuation nasal spray by Nasal route.  tapentadol 150 mg Tb12 Take 150 mg by mouth.  furosemide (LASIX) 40 mg tablet Take  by mouth daily.  allopurinol (ZYLOPRIM) 100 mg tablet 100 mg.  0    predniSONE (DELTASONE) 10 mg tablet   0    SILDENAFIL CITRATE (VIAGRA PO) Take  by mouth.  ACETAMINOPHEN (TYLENOL PO) Take  by mouth.  simvastatin (ZOCOR) 10 mg tablet Take  by mouth nightly.  atenolol (TENORMIN) 50 mg tablet Take 50 mg by mouth daily.  VACUUM ERECTION DEVICE SYSTEM by Does Not Apply route. Allergies   Allergen Reactions    Amoxicillin Diarrhea    Topamax [Topiramate] Other (comments)     Weakness and pain          PHYSICAL EXAMINATION    Visit Vitals  /82   Pulse 65   Temp 98.4 °F (36.9 °C) (Oral)   Ht 6' 1\" (1.854 m)   Wt 289 lb (131.1 kg)   SpO2 98%   BMI 38.13 kg/m²       CONSTITUTIONAL: NAD, A&O x 3  SENSATION: Intact to light touch throughout  RANGE OF MOTION: The patient has full passive range of motion in all four extremities. MOTOR:  Straight Leg Raise: Negative, bilateral               Hip Flex Knee Ext Knee Flex Ankle DF GTE Ankle PF Tone   Right +4/5 +4/5 +4/5 +4/5 +4/5 +4/5 +4/5   Left +4/5 +4/5 +4/5 +4/5 +4/5 +4/5 +4/5       ASSESSMENT   Diagnoses and all orders for this visit:    1.  Spinal stenosis, lumbar region with neurogenic claudication    2. DDD (degenerative disc disease), lumbar    3. Lumbar radiculopathy    4. Other intervertebral disc displacement, lumbar region          IMPRESSION AND PLAN:  Patient continues to do well and reports no pain complaints x 3 months. He should attempt a slow taper of his Neurontin 300 mg TID as tolerated. Patient is neurologically intact. I recommend he continue with his HEP daily. I will see the patient back in 6 month's time or earlier if needed. Written by [place], as dictated by Thien Anderson MD  I examined the patient, reviewed and agree with the note.

## 2019-08-30 PROBLEM — R31.0 GROSS HEMATURIA: Status: ACTIVE | Noted: 2019-08-30

## 2021-06-16 PROBLEM — R07.2 PRECORDIAL PAIN: Status: ACTIVE | Noted: 2018-02-09

## 2021-06-16 PROBLEM — Z71.89 ADVICE GIVEN ABOUT COVID-19 VIRUS INFECTION: Status: ACTIVE | Noted: 2020-04-08

## 2021-06-16 PROBLEM — L73.9 FOLLICULITIS: Status: ACTIVE | Noted: 2020-07-08

## 2021-06-16 PROBLEM — K40.90 HERNIA, INGUINAL, RIGHT: Status: ACTIVE | Noted: 2020-07-08

## 2021-06-16 PROBLEM — R06.02 SHORTNESS OF BREATH: Status: ACTIVE | Noted: 2019-04-05

## 2021-06-16 PROBLEM — I48.91 A-FIB (HCC): Status: ACTIVE | Noted: 2017-11-21

## 2021-06-16 PROBLEM — S29.011A STRAIN OF CHEST WALL, INITIAL ENCOUNTER: Status: ACTIVE | Noted: 2021-06-04

## 2021-06-16 PROBLEM — D22.9 BENIGN MOLE: Status: ACTIVE | Noted: 2021-06-04

## 2022-06-29 PROBLEM — K80.50 BILIARY COLIC: Status: ACTIVE | Noted: 2022-03-11

## 2022-06-29 PROBLEM — G57.12 MERALGIA PARAESTHETICA, LEFT: Status: ACTIVE | Noted: 2022-04-15

## 2022-06-29 PROBLEM — E78.5 HYPERLIPIDEMIA: Status: ACTIVE | Noted: 2022-03-21

## 2022-06-29 PROBLEM — K66.8 BILOMA: Status: ACTIVE | Noted: 2022-03-16

## 2022-06-29 PROBLEM — K92.1 MELENA: Status: ACTIVE | Noted: 2022-03-20

## 2022-06-29 PROBLEM — G43.009 MIGRAINE WITHOUT AURA AND WITHOUT STATUS MIGRAINOSUS, NOT INTRACTABLE: Status: ACTIVE | Noted: 2022-01-11

## 2022-06-29 PROBLEM — R10.13 EPIGASTRIC PAIN: Status: ACTIVE | Noted: 2022-03-15

## 2022-06-29 PROBLEM — E66.9 OBESITY, CLASS II, BMI 35-39.9: Status: ACTIVE | Noted: 2022-03-15

## 2022-06-29 PROBLEM — R53.81 PHYSICAL DECONDITIONING: Status: ACTIVE | Noted: 2022-03-25

## 2022-06-29 PROBLEM — Z90.49 S/P CHOLECYSTECTOMY: Status: ACTIVE | Noted: 2022-02-22

## 2022-06-29 PROBLEM — K81.0 ACUTE CHOLECYSTITIS: Status: ACTIVE | Noted: 2022-02-10
